# Patient Record
Sex: MALE | Race: WHITE | NOT HISPANIC OR LATINO | Employment: OTHER | URBAN - METROPOLITAN AREA
[De-identification: names, ages, dates, MRNs, and addresses within clinical notes are randomized per-mention and may not be internally consistent; named-entity substitution may affect disease eponyms.]

---

## 2021-04-20 ENCOUNTER — OFFICE VISIT (OUTPATIENT)
Dept: ORTHOPEDIC SURGERY | Facility: CLINIC | Age: 56
End: 2021-04-20

## 2021-04-20 VITALS
BODY MASS INDEX: 33.74 KG/M2 | WEIGHT: 241 LBS | HEIGHT: 71 IN | HEART RATE: 69 BPM | DIASTOLIC BLOOD PRESSURE: 94 MMHG | SYSTOLIC BLOOD PRESSURE: 156 MMHG

## 2021-04-20 DIAGNOSIS — M25.511 RIGHT SHOULDER PAIN, UNSPECIFIED CHRONICITY: ICD-10-CM

## 2021-04-20 DIAGNOSIS — M75.21 BICEPS TENDINITIS OF RIGHT UPPER EXTREMITY: ICD-10-CM

## 2021-04-20 DIAGNOSIS — M75.52 BURSITIS OF LEFT SHOULDER: ICD-10-CM

## 2021-04-20 DIAGNOSIS — M25.512 LEFT SHOULDER PAIN, UNSPECIFIED CHRONICITY: ICD-10-CM

## 2021-04-20 DIAGNOSIS — M75.111 NONTRAUMATIC INCOMPLETE TEAR OF RIGHT ROTATOR CUFF: Primary | ICD-10-CM

## 2021-04-20 DIAGNOSIS — M75.41 IMPINGEMENT SYNDROME OF RIGHT SHOULDER: ICD-10-CM

## 2021-04-20 DIAGNOSIS — M75.22 BICEPS TENDINITIS OF LEFT UPPER EXTREMITY: ICD-10-CM

## 2021-04-20 PROCEDURE — 99204 OFFICE O/P NEW MOD 45 MIN: CPT | Performed by: ORTHOPAEDIC SURGERY

## 2021-04-20 RX ORDER — LANSOPRAZOLE 30 MG/1
30 CAPSULE, DELAYED RELEASE ORAL DAILY
COMMUNITY
Start: 2021-04-02

## 2021-04-20 RX ORDER — METHYLPREDNISOLONE 4 MG/1
TABLET ORAL
Qty: 1 EACH | Refills: 0 | Status: SHIPPED | OUTPATIENT
Start: 2021-04-20 | End: 2021-06-10

## 2021-04-20 RX ORDER — ACETAMINOPHEN 500 MG
500 TABLET ORAL AS NEEDED
COMMUNITY

## 2021-04-20 NOTE — PROGRESS NOTES
[facility] Orthopaedic Surgery Office Visit - Keon Vasquez MD    Office Visit       Patient Name: Jose Luis Hernadez    Chief Complaint:   Chief Complaint   Patient presents with   • Right Shoulder - Pain   • Left Shoulder - Pain       Referring Physician: Referring, Self    History of Present Illness:   Jose Luis Hernadez is a 55 y.o. male who presents with bilateral body part: shoulder Reason: pain.  Onset:Onset: atraumatic and gradual in nature Left and fall on ice Right. The issue has been ongoing for 5 year(s). Pain is a 3/10 on the pain scale. Pain is described as Pain Characterization: dull, aching, burning, throbbing, stabbing and shooting. Associated symptoms include Symptoms: pain. The pain is worse with sitting, sleeping and working; ice and pain medication and/or NSAID improve the pain. Previous treatments have included: physical therapy and viscosupplementation (last injection 2018 by Dr. Vasquez).  I have reviewed the patient's history of present illness as noted/entered above.    I have reviewed the patient's past medical history, surgical history, social history, family history, medications, and allergies as noted in the electronic medical record and as noted/entered.  I have reviewed the patient's review of systems as noted/enter and updated as noted in the patient's HPI.    Bilateral shoulder pain; RIGHT >>>>> left  Right shoulder after a fall x10 years - fell on ice, left shoulder over the last 5 years    Known history of rotator cuff on the right partial subscapularis tear and biceps pathology, labral tearing    Treated with activity modifications, physical therapy, injection in the right shoulder that I performed approximately 4 years ago.    He desires to proceed with an injection prior to his upcoming trip to [place] in June    I have known Jose Luis for many years excellent gentleman.    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  Negative for chills, fatigue and fever.   HENT: Negative.  Negative for congestion and dental problem.    Eyes: Negative.  Negative for blurred vision.   Respiratory: Negative.  Negative for shortness of breath.    Cardiovascular: Negative.  Negative for leg swelling.   Gastrointestinal: Negative.  Negative for abdominal pain.   Endocrine: Negative.  Negative for polyuria.   Genitourinary: Negative.  Negative for difficulty urinating.   Musculoskeletal: Positive for arthralgias and back pain.   Skin: Negative.    Allergic/Immunologic: Negative.    Neurological: Negative.    Hematological: Negative.  Negative for adenopathy.   Psychiatric/Behavioral: Negative.  Negative for behavioral problems.        Past Medical History: History reviewed. No pertinent past medical history.    Past Surgical History:   Past Surgical History:   Procedure Laterality Date   • BACK SURGERY  2012    L5 discectomy   • VASECTOMY         Family History: History reviewed. No pertinent family history.    Social History:   Social History     Socioeconomic History   • Marital status:      Spouse name: Not on file   • Number of children: Not on file   • Years of education: Not on file   • Highest education level: Not on file   Tobacco Use   • Smoking status: Never Smoker   • Smokeless tobacco: Never Used   Substance and Sexual Activity   • Alcohol use: Yes     Comment: weekly   • Drug use: Never   • Sexual activity: Defer       Medications:   Current Outpatient Medications:   •  acetaminophen (TYLENOL) 500 MG tablet, Take 500 mg by mouth As Needed for Mild Pain ., Disp: , Rfl:   •  diclofenac (VOLTAREN) 50 MG EC tablet, , Disp: , Rfl:   •  lansoprazole (PREVACID) 30 MG capsule, Take 30 mg by  "mouth Daily., Disp: , Rfl:   •  methylPREDNISolone (MEDROL) 4 MG dose pack, Use as directed by package instructions, Disp: 1 each, Rfl: 0    Allergies: No Known Allergies    The following portions of the patient's history were reviewed and updated as appropriate: allergies, current medications, past family history, past medical history, past social history, past surgical history and problem list.        Objective    Objective      Vital Signs:   Vitals:    04/20/21 0821   BP: 156/94   Pulse: 69   Weight: 109 kg (241 lb)   Height: 180.3 cm (71\")       Ortho Exam:  Well perfused, SLT intact  Breathing comfortably on room air  Left shoulder mild impingement syndrome with good active and passive range of motion good rotator cuff strength    Right shoulder has a positive Salinas, positive Neer, anterior biceps testing, anterior subscap pain positive upper cut, positive [place]'s at the biceps, positive tenderness palpation about the biceps  Pain with rotator cuff testing on the right, pain with Jobes testing    Results Review:   Imaging Results (Last 24 Hours)     Procedure Component Value Units Date/Time    XR Shoulder 2+ View Right [[id number]] Resulted: 04/20/21 0828     Updated: 04/20/21 0828    Narrative:      Imaging: shoulder x-rays 2 views - AP and axillary x-ray views    Side: RIGHT    Indication for shoulder x-ray 2 views: shoulder pain    Comparison: no comparison views available    Findings: No acute bony pathology. No superior humeral head migration.    The humeral head remains centered in the glenohumeral joint. No evidence   of calcific tendonitis.      I personally reviewed the above x-rays and discussed with the patient.      XR Shoulder 2+ View Left [[id number]] Resulted: 04/20/21 0827     Updated: 04/20/21 0828    Narrative:      Imaging: shoulder x-rays 2 views - AP and axillary x-ray views    Side: LEFT    Indication for shoulder x-ray 2 views: shoulder pain    Comparison: no comparison views " available    Findings: No acute bony pathology. No superior humeral head migration.    The humeral head remains centered in the glenohumeral joint. No evidence   of calcific tendonitis.    Subchondral sclerosis and subchondral cyst formation noted in the greater   tuberosity.    I personally reviewed the above x-rays and discussed with the patient.              Procedures           Assessment / Plan      Assessment/Plan:   Problem List Items Addressed This Visit        Musculoskeletal and Injuries    Nontraumatic incomplete tear of right rotator cuff - Primary    Relevant Medications    methylPREDNISolone (MEDROL) 4 MG dose pack    Other Relevant Orders    MRI Shoulder Right Without Contrast    Biceps tendinitis of left upper extremity    Relevant Medications    methylPREDNISolone (MEDROL) 4 MG dose pack    Impingement syndrome of right shoulder    Bursitis of left shoulder      Other Visit Diagnoses     Left shoulder pain, unspecified chronicity        Relevant Orders    XR Shoulder 2+ View Left (Completed)    Right shoulder pain, unspecified chronicity        Relevant Orders    XR Shoulder 2+ View Right (Completed)          MRI of the shoulder is recommended.  Indication: Right shoulder rotator cuff tear  The MRI is critical to evaluate for rotator cuff tearing and will help with possible surgical planning.    The patient is treated the right shoulder nonoperatively for about 4 years now he has persistent pain, biceps pain, rotator cuff pain desires to proceed with the updated MRI and possibility of surgery.    Medrol dosepak - risks and benefit of this medication was discussed including risks with Diabetes and bump in blood glucose.  A 6-day steroid Medrol dosepak was recommended followed by an NSAID after the dosepak is finished.      I will see him back after the right shoulder MRI he does desire to discuss possible injection prior to his backcountry trip to [place]      Follow Up: After right shoulder MRI         Keon Vasquez MD, FAAOS  Orthopedic Surgeon  Fellowship Trained Shoulder and Elbow Surgeon  [facility]  Orthopedics and Sports Medicine  [address], [address]  [place] [address]    04/20/21  08:44 EDT    Please note that portions of this note may have been completed with a voice recognition program. Efforts were made to edit the dictations, but occasionally words are mistranscribed.

## 2021-04-29 ENCOUNTER — HOSPITAL ENCOUNTER (OUTPATIENT)
Dept: MRI IMAGING | Facility: HOSPITAL | Age: 56
Discharge: HOME | End: 2021-04-29
Admitting: ORTHOPAEDIC SURGERY

## 2021-04-29 DIAGNOSIS — M75.21 BICEPS TENDINITIS OF RIGHT UPPER EXTREMITY: ICD-10-CM

## 2021-04-29 DIAGNOSIS — M75.111 NONTRAUMATIC INCOMPLETE TEAR OF RIGHT ROTATOR CUFF: ICD-10-CM

## 2021-04-29 PROCEDURE — 73221 MRI JOINT UPR EXTREM W/O DYE: CPT

## 2021-05-11 ENCOUNTER — OFFICE VISIT (OUTPATIENT)
Dept: ORTHOPEDIC SURGERY | Facility: CLINIC | Age: 56
End: 2021-05-11

## 2021-05-11 VITALS
BODY MASS INDEX: 32.1 KG/M2 | SYSTOLIC BLOOD PRESSURE: 151 MMHG | WEIGHT: 229.28 LBS | DIASTOLIC BLOOD PRESSURE: 92 MMHG | HEIGHT: 71 IN | HEART RATE: 63 BPM

## 2021-05-11 DIAGNOSIS — M75.41 IMPINGEMENT SYNDROME OF RIGHT SHOULDER: ICD-10-CM

## 2021-05-11 DIAGNOSIS — M75.21 BICEPS TENDINITIS OF RIGHT UPPER EXTREMITY: ICD-10-CM

## 2021-05-11 DIAGNOSIS — M75.51 BURSITIS OF RIGHT SHOULDER: ICD-10-CM

## 2021-05-11 DIAGNOSIS — M75.111 NONTRAUMATIC INCOMPLETE TEAR OF RIGHT ROTATOR CUFF: Primary | ICD-10-CM

## 2021-05-11 PROCEDURE — 99213 OFFICE O/P EST LOW 20 MIN: CPT | Performed by: ORTHOPAEDIC SURGERY

## 2021-05-11 NOTE — PROGRESS NOTES
Procedure   Medium Joint Arthrocentesis  Date/Time: 5/11/2021 8:30 AM  Consent given by: patient  Site marked: site marked  Timeout: Immediately prior to procedure a time out was called to verify the correct patient, procedure, equipment, support staff and site/side marked as required   Supporting Documentation  Indications: pain   Procedure Details  Location: shoulder - Acromioclavicular joint: Right Bicep.  Preparation: Patient was prepped and draped in the usual sterile fashion  Needle size: 23 G  Approach: anterolateral  Medications administered: 5 mL lidocaine PF 1% 1 %; 160 mg methylPREDNISolone acetate 80 MG/ML  Patient tolerance: patient tolerated the procedure well with no immediate complications

## 2021-05-11 NOTE — PROGRESS NOTES
[facility] Orthopaedic Surgery Office Follow Up       Office Follow Up Visit       Patient Name: Jose Luis Hernadez    Chief Complaint:   Chief Complaint   Patient presents with   • Follow-up     Post MRI 04/29/21 - Nontraumatic incomplete tear of right rotator cuff        Referring Physician: No ref. provider found    History of Present Illness:   It has been 3  week(s) since Jose Luis Hernadez's last visit. Jose Luis Hernadez returns to clinic today for F/U: follow-up of rightBody Part: shoulderReason: pain. The issue has been ongoing for 8 year(s). Jose Luis Hernadez rates HIS/HER: hispain at 4/10 on the pain scale. Previous/current treatments: NSAIDS, physical therapy and oral steroids. Current symptoms:Symptoms: same as prior visit. The pain is worse with sleeping; ice improves the pain. Overall, he/she: heis doing the same. I have reviewed the patient's history of present illness as noted/entered above.    I have reviewed the patient's past medical history, surgical history, social history, family history, medications, and allergies as noted in the electronic medical record and as noted/entered.  I have reviewed the patient's review of systems as noted/enter and updated as noted in the patient's HPI.      RIGHT SHOULDER  Pain persists      PRIOR:  Bilateral shoulder pain; RIGHT >>>>> left  Right shoulder after a fall x10 years - fell on ice, left shoulder over the last 5 years     Known history of rotator cuff on the right partial subscapularis tear and biceps pathology, labral tearing     Treated with activity modifications, physical therapy, injection in the right shoulder that I performed approximately 4 years ago.     He desires to proceed with an injection prior to his upcoming trip to [place] in June     I have known Jose Luis for many years excellent gentleman.     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  Negative for chills, fatigue and fever.   HENT: Negative.  Negative for congestion and dental problem.    Eyes: Negative.  Negative for blurred vision.   Respiratory: Negative.  Negative for shortness of breath.    Cardiovascular: Negative.  Negative for leg swelling.   Gastrointestinal: Negative.  Negative for abdominal pain.   Endocrine: Negative.  Negative for polyuria.   Genitourinary: Negative.  Negative for difficulty urinating.   Musculoskeletal: Positive for arthralgias.   Skin: Negative.    Allergic/Immunologic: Negative.    Neurological: Negative.    Hematological: Negative.  Negative for adenopathy.   Psychiatric/Behavioral: Negative.  Negative for behavioral problems.        Past Medical History: History reviewed. No pertinent past medical history.    Past Surgical History:   Past Surgical History:   Procedure Laterality Date   • BACK SURGERY  2012    L5 discectomy   • VASECTOMY         Family History: History reviewed. No pertinent family history.    Social History:   Social History     Socioeconomic History   • Marital status:      Spouse name: Not on file   • Number of children: Not on file   • Years of education: Not on file   • Highest education level: Not on file   Tobacco Use   • Smoking status: Never Smoker   • Smokeless tobacco: Never Used   Substance and Sexual Activity   • Alcohol use: Yes     Comment: weekly   • Drug use: Never   • Sexual activity: Defer       Medications:   Current Outpatient Medications:   •  acetaminophen (TYLENOL) 500 MG tablet, Take 500 mg by mouth As Needed for Mild Pain ., Disp: , Rfl:   •  diclofenac (VOLTAREN) 50 MG EC tablet, , Disp: , Rfl:   •  lansoprazole (PREVACID) 30 MG capsule, Take 30 mg by mouth Daily., Disp: , Rfl:   •  methylPREDNISolone (MEDROL) 4  "MG dose pack, Use as directed by package instructions, Disp: 1 each, Rfl: 0    Allergies: No Known Allergies    The following portions of the patient's history were reviewed and updated as appropriate: allergies, current medications, past family history, past medical history, past social history, past surgical history and problem list.        Objective    Objective      Vital Signs:   Vitals:    05/11/21 0807   BP: 151/92   Pulse: 63   Weight: 104 kg (229 lb 4.5 oz)   Height: 180.3 cm (70.98\")       Ortho Exam:  Stable exam compared to prior:Right shoulder has a positive [place], positive Neer, anterior biceps testing, anterior subscap pain positive upper cut, positive [place]'s at the biceps, positive tenderness palpation about the biceps  Pain with rotator cuff testing on the right, pain with Jobes testing    Anterior biceps pain, appears to be primary pain generator.  Pain with palpation in upper cut biceps    Results Review:  Imaging Results (Last 24 Hours)     ** No results found for the last 24 hours. **          MRI Shoulder Right Without Contrast    Result Date: 4/30/2021  1. Although non arthrographic evaluation there is abnormal signal and tearing of the posterior glenoid labrum with cystic degeneration or paralabral cyst formation along the inferior margin. 2. Partial thickness tearing posterior articular sided fibers supraspinatus.  D:  04/29/2021 E:  04/29/2021    This report was finalized on 4/30/2021 5:02 PM by Dr. Edwin Kaye.        Shoulder MRI   I personally reviewed the patient's MRI and my personal findings are noted below.     SIDE: Right shoulder     DATE: 4/29/2021     Performed: [facility]  Arthrogram: no  Open or standard MRI: standard  MRI quality: good     Indication: shoulder pain     Comparison: NONE     Findings:  Supraspinatus: Partial articular sided and bursal sided tearing of the supraspinatus  Infraspinatus: Intact  Subscapularis: Partial articular sided tearing on my " read  Teres minor: no obvious tear     Fatty infiltration of the rotator cuff: Negative  Muscle atrophy of the rotator cuff: Negative     Long head of the biceps: Tendinopathy and medial subluxation with associated partial articular sided tearing subscapularis     Labrum: Posterior and posterior inferior labral tearing with para labral cyst     AC joint: Mild degenerative changes     Glenohumeral joint: Posterior findings as noted     Fracture: no obvious fracture     Hill-Sachs lesion: absent    Procedures    He is targeting a an injection in the future prior to his trip      Assessment / Plan      Assessment/Plan:   Problem List Items Addressed This Visit        Musculoskeletal and Injuries    Nontraumatic incomplete tear of right rotator cuff - Primary    Relevant Medications    methylPREDNISolone (MEDROL) 4 MG dose pack    Impingement syndrome of right shoulder    Biceps tendinitis of right upper extremity    Relevant Medications    methylPREDNISolone (MEDROL) 4 MG dose pack    Bursitis of right shoulder          RIGHT SHOULDER    She got a big trip coming up desires proceed with an injection in advance of the trip for pain relief and it worked in the years past.  He definitely would want a hold on the surgery until the late fall at the earliest.  We will see him back in 2 to 3 months to reassess.  Counseled on labral tearing, biceps pathology, rotator cuff tearing partial.  Most of the tears have held her own.  Counseled on operative versus nonoperative measures injection completed he will proceed with home exercise program and see me back in 2 to 3 months but will see me in the interim for an injection    Follow Up: He will come back in for reevaluation injection prior to his trip      Keon Vasquez MD, FAAOS  Orthopedic Surgeon  Fellowship Trained Shoulder and Elbow Surgeon  [facility]  Orthopedics and Sports Medicine  [address] [place] [address], [address]  [place]  [address]    05/11/21  08:45 EDT    Please note that portions of this note may have been completed with a voice recognition program. Efforts were made to edit the dictations, but occasionally words are mistranscribed.

## 2021-06-10 ENCOUNTER — OFFICE VISIT (OUTPATIENT)
Dept: ORTHOPEDIC SURGERY | Facility: CLINIC | Age: 56
End: 2021-06-10

## 2021-06-10 VITALS
WEIGHT: 229.28 LBS | BODY MASS INDEX: 32.1 KG/M2 | DIASTOLIC BLOOD PRESSURE: 92 MMHG | HEART RATE: 90 BPM | HEIGHT: 71 IN | SYSTOLIC BLOOD PRESSURE: 151 MMHG

## 2021-06-10 DIAGNOSIS — M75.111 NONTRAUMATIC INCOMPLETE TEAR OF RIGHT ROTATOR CUFF: Primary | ICD-10-CM

## 2021-06-10 DIAGNOSIS — M75.51 BURSITIS OF RIGHT SHOULDER: ICD-10-CM

## 2021-06-10 DIAGNOSIS — M75.21 BICEPS TENDINITIS OF RIGHT UPPER EXTREMITY: ICD-10-CM

## 2021-06-10 DIAGNOSIS — M75.41 IMPINGEMENT SYNDROME OF RIGHT SHOULDER: ICD-10-CM

## 2021-06-10 PROCEDURE — 20610 DRAIN/INJ JOINT/BURSA W/O US: CPT | Performed by: ORTHOPAEDIC SURGERY

## 2021-06-10 RX ORDER — LIDOCAINE HYDROCHLORIDE 10 MG/ML
5 INJECTION, SOLUTION EPIDURAL; INFILTRATION; INTRACAUDAL; PERINEURAL
Status: COMPLETED | OUTPATIENT
Start: 2021-06-10 | End: 2021-06-10

## 2021-06-10 RX ORDER — METHYLPREDNISOLONE ACETATE 80 MG/ML
80 INJECTION, SUSPENSION INTRA-ARTICULAR; INTRALESIONAL; INTRAMUSCULAR; SOFT TISSUE
Status: COMPLETED | OUTPATIENT
Start: 2021-06-10 | End: 2021-06-10

## 2021-06-10 RX ADMIN — METHYLPREDNISOLONE ACETATE 80 MG: 80 INJECTION, SUSPENSION INTRA-ARTICULAR; INTRALESIONAL; INTRAMUSCULAR; SOFT TISSUE at 09:39

## 2021-06-10 RX ADMIN — LIDOCAINE HYDROCHLORIDE 5 ML: 10 INJECTION, SOLUTION EPIDURAL; INFILTRATION; INTRACAUDAL; PERINEURAL at 09:39

## 2021-06-10 NOTE — PROGRESS NOTES
Procedure   Large Joint Arthrocentesis: R subacromial bursa  Date/Time: 6/10/2021 9:39 AM  Consent given by: patient  Site marked: site marked  Timeout: Immediately prior to procedure a time out was called to verify the correct patient, procedure, equipment, support staff and site/side marked as required   Procedure Details  Location: shoulder - R subacromial bursa  Preparation: Patient was prepped and draped in the usual sterile fashion  Needle size: 23 G  Approach: posterior  Medications administered: 5 mL lidocaine PF 1% 1 %; 80 mg methylPREDNISolone acetate 80 MG/ML  Patient tolerance: patient tolerated the procedure well with no immediate complications

## 2021-06-10 NOTE — PROGRESS NOTES
[facility] – [place] Orthopaedic Surgery Office Follow Up       Office Follow Up Visit       Patient Name: Jose Luis Hernadez    Chief Complaint:   Chief Complaint   Patient presents with   • Follow-up     4 weeks- Nontraumatic incomplete tear of right rotator cuff        Referring Physician: No ref. provider found    History of Present Illness:   It has been 4  week(s) since Jose Luis Hernadez's last visit. Jose Luis Hernadez returns to clinic today for F/U: follow-up of rightBody Part: shoulderReason: pain. The issue has been ongoing for 8 year(s). Jose Luis Hernadez rates HIS/HER: hispain at 5/10 on the pain scale. Previous/current treatments: NSAIDS, physical therapy and oral steroids. Current symptoms:Symptoms: same as prior visit. The pain is worse with sleeping and working; ice and pain medication and/or NSAID improves the pain. Overall, he/she: heis doing about the same. I have reviewed the patient's history of present illness as noted/entered above.    I have reviewed the patient's past medical history, surgical history, social history, family history, medications, and allergies as noted in the electronic medical record and as noted/entered.  I have reviewed the patient's review of systems as noted/enter and updated as noted in the patient's HPI.      RIGHT SHOULDER  6/10/2021:  Right shoulder pain, Dosepak did help so having trouble sleep and work  Canoeing 10 miles first day of trip in [place], looking forward to trip      PRIOR:  RIGHT SHOULDER  Pain persists          PRIOR:  Bilateral shoulder pain; RIGHT >>>>> left  Right shoulder after a fall x10 years - fell on ice, left shoulder over the last 5 years     Known history of rotator cuff on the right partial subscapularis tear and biceps pathology, labral tearing     Treated with activity modifications, physical therapy, injection in the right shoulder that I performed approximately 4 years ago.     He desires to proceed with  an injection prior to his upcoming trip to [place] in June     I have known Jose Luis for many years excellent gentleman.     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  Negative for chills, fatigue and fever.   HENT: Negative.  Negative for congestion and dental problem.    Eyes: Negative.  Negative for blurred vision.   Respiratory: Negative.  Negative for shortness of breath.    Cardiovascular: Negative.  Negative for leg swelling.   Gastrointestinal: Negative.  Negative for abdominal pain.   Endocrine: Negative.  Negative for polyuria.   Genitourinary: Negative.  Negative for difficulty urinating.   Musculoskeletal: Positive for arthralgias.   Skin: Negative.    Allergic/Immunologic: Negative.    Neurological: Negative.    Hematological: Negative.  Negative for adenopathy.   Psychiatric/Behavioral: Negative.  Negative for behavioral problems.        Past Medical History: History reviewed. No pertinent past medical history.    Past Surgical History:   Past Surgical History:   Procedure Laterality Date   • BACK SURGERY  2012    L5 discectomy   • VASECTOMY         Family History: History reviewed. No pertinent family history.    Social History:   Social History     Socioeconomic History   • Marital status:      Spouse name: Not on file   • Number of children: Not on file   • Years of education: Not on file   • Highest education level: Not on file   Tobacco Use   • Smoking status: Never Smoker   • Smokeless tobacco: Never Used   Substance and Sexual Activity   • Alcohol use: Yes     Comment: weekly   • Drug use: Never   • Sexual activity: Defer       Medications:   Current Outpatient Medications:   •  acetaminophen (TYLENOL) 500 MG tablet, Take 500 mg by  "mouth As Needed for Mild Pain ., Disp: , Rfl:   •  diclofenac (VOLTAREN) 50 MG EC tablet, , Disp: , Rfl:   •  lansoprazole (PREVACID) 30 MG capsule, Take 30 mg by mouth Daily., Disp: , Rfl:     Allergies: No Known Allergies    The following portions of the patient's history were reviewed and updated as appropriate: allergies, current medications, past family history, past medical history, past social history, past surgical history and problem list.        Objective    Objective      Vital Signs:   Vitals:    06/10/21 0915   BP: 151/92   Pulse: 90   Weight: 104 kg (229 lb 4.5 oz)   Height: 180.3 cm (70.98\")       Ortho Exam:  Right shoulder anterior subacromial pain persist, positive impingement syndrome, anterior biceps pain with testing    Results Review:  Imaging Results (Last 24 Hours)     ** No results found for the last 24 hours. **          MRI Shoulder Right Without Contrast    Result Date: 4/30/2021  1. Although non arthrographic evaluation there is abnormal signal and tearing of the posterior glenoid labrum with cystic degeneration or paralabral cyst formation along the inferior margin. 2. Partial thickness tearing posterior articular sided fibers supraspinatus.  D:  04/29/2021 E:  04/29/2021    This report was finalized on 4/30/2021 5:02 PM by Dr. Edwin Kaye.        Prior MRI as noted    Procedures    Right SHOULDER SUBACROMIAL SPACE INJECTION: Risks and benefits of a shoulder subacromial space injection were discussed and the patient desired to proceed. Verbal consent was obtained. The patient understood the risk of infection, potential skin changes, bump in blood glucose especially with diabetes, nerve injury, possibility of increased pain in the short term, and possible incomplete pain relief.  Using sterile technique, the shoulder subacromial space was injected from a posterior approach with 1mL of 80mg/mL Depo Medrol and 4cc of lidocaine with aspiration prior to injection. The patient tolerated the " procedure without difficulty.  CPT CODE 01478 for major joint aspiration/injection          Assessment / Plan      Assessment/Plan:   Problem List Items Addressed This Visit        Musculoskeletal and Injuries    Nontraumatic incomplete tear of right rotator cuff - Primary    Impingement syndrome of right shoulder    Biceps tendinitis of right upper extremity    Bursitis of right shoulder          RIGHT SHOULDER  Injection, conservative measures, he will keep me updated pending progress he is got a big trip coming up and we will stick with conservative course at this time; surgical planning still possible    Follow Up: 2 to 3 months pending progress after injection.      Keon Vasquez MD, FAAOS  Orthopedic Surgeon  Fellowship Trained Shoulder and Elbow Surgeon  [facility]  Orthopedics and Sports Medicine  [address] [place] [address], [address]  [place] [address]    06/10/21  09:41 EDT    Please note that portions of this note may have been completed with a voice recognition program. Efforts were made to edit the dictations, but occasionally words are mistranscribed.

## 2023-02-27 ENCOUNTER — TELEPHONE (OUTPATIENT)
Dept: ORTHOPEDIC SURGERY | Facility: CLINIC | Age: 58
End: 2023-02-27

## 2023-02-27 NOTE — TELEPHONE ENCOUNTER
Caller: ЕЛЕНА TAN    Relationship to patient: SELF    Best call back number:  [phone]    Chief complaint:  PATIENT REQUESTING APPT. FOR RIGHT SHOULDER INJECTION    Type of visit:  F/U/INJ

## 2023-03-03 ENCOUNTER — OFFICE VISIT (OUTPATIENT)
Dept: ORTHOPEDIC SURGERY | Facility: CLINIC | Age: 58
End: 2023-03-03
Payer: COMMERCIAL

## 2023-03-03 VITALS
DIASTOLIC BLOOD PRESSURE: 98 MMHG | WEIGHT: 250.8 LBS | BODY MASS INDEX: 35.11 KG/M2 | HEIGHT: 71 IN | SYSTOLIC BLOOD PRESSURE: 138 MMHG

## 2023-03-03 DIAGNOSIS — M75.111 NONTRAUMATIC INCOMPLETE TEAR OF RIGHT ROTATOR CUFF: Primary | ICD-10-CM

## 2023-03-03 PROCEDURE — 20610 DRAIN/INJ JOINT/BURSA W/O US: CPT | Performed by: ORTHOPAEDIC SURGERY

## 2023-03-03 RX ORDER — LIDOCAINE HYDROCHLORIDE 10 MG/ML
5 INJECTION, SOLUTION EPIDURAL; INFILTRATION; INTRACAUDAL; PERINEURAL
Status: COMPLETED | OUTPATIENT
Start: 2023-03-03 | End: 2023-03-03

## 2023-03-03 RX ORDER — TRIAMCINOLONE ACETONIDE 40 MG/ML
40 INJECTION, SUSPENSION INTRA-ARTICULAR; INTRAMUSCULAR
Status: COMPLETED | OUTPATIENT
Start: 2023-03-03 | End: 2023-03-03

## 2023-03-03 RX ADMIN — LIDOCAINE HYDROCHLORIDE 5 ML: 10 INJECTION, SOLUTION EPIDURAL; INFILTRATION; INTRACAUDAL; PERINEURAL at 08:52

## 2023-03-03 RX ADMIN — TRIAMCINOLONE ACETONIDE 40 MG: 40 INJECTION, SUSPENSION INTRA-ARTICULAR; INTRAMUSCULAR at 08:52

## 2023-03-03 NOTE — PROGRESS NOTES
Procedure   - Large Joint Arthrocentesis: R subacromial bursa on 3/3/2023 8:52 AM  Indications: pain  Details: (23g) needle, posterior approach  Medications: 5 mL lidocaine PF 1% 1 %; 40 mg triamcinolone acetonide 40 MG/ML  Outcome: tolerated well, no immediate complications  Procedure, treatment alternatives, risks and benefits explained, specific risks discussed. Consent was given by the patient. Immediately prior to procedure a time out was called to verify the correct patient, procedure, equipment, support staff and site/side marked as required. Patient was prepped and draped in the usual sterile fashion.

## 2023-03-03 NOTE — PROGRESS NOTES
[facility] [place]       Office Follow Up Visit       Patient Name: Jose Luis Hernadez    Chief Complaint:   Chief Complaint   Patient presents with   • Follow-up     1.5 year follow up - nontraumatic incomplete tear of right rotator cuff       Referring Physician: No ref. provider found    History of Present Illness:   It has been 1.5  year(s) since Jose Luis Hernadez's last visit. Jose Luis Hernadez returns to clinic today for F/U: follow-up of rightBody Part: shoulderReason: pain. The issue has been ongoing for 10 year(s). Jose Luis Hernadez rates HIS/HER: hispain at 7/10 on the pain scale. Previous/current treatments: NSAIDS, physical therapy and oral steroids. Current symptoms:Symptoms: pain. The pain is worse with sleeping and working; resting and ice improves the pain. Overall, he/she: heis doing better.  I have reviewed the patient's history of present illness as noted/entered above.    I have reviewed the patient's past medical history, surgical history, social history, family history, medications, and allergies as noted in the electronic medical record and as noted/entered.  I have reviewed the patient's review of systems as noted/enter and updated as noted in the patient's HPI.    RIGHT SHOULDER  6/10/2021:  Right shoulder pain, Dosepak did help so having trouble sleep and work  Canoeing 10 miles first day of trip in [place], looking forward to trip        PRIOR:  RIGHT SHOULDER  Pain persists           PRIOR:  Bilateral shoulder pain; RIGHT >>>>> left  Right shoulder after a fall x10 years - fell on ice, left shoulder over the last 5 years  0   Known history of rotator cuff on the right partial subscapularis tear and biceps pathology, labral tearing     Treated with activity modifications, physical therapy, injection in the right shoulder that I performed approximately 4 years ago.     He desires to proceed with an injection prior to his  upcoming trip to [place] in June     I have known Jose Luis for many years excellent gentleman.     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 Although non arthrographic evaluation there is abnormal signal and tearing of the posterior glenoid labrum with cystic degeneration or paralabral cyst formation along the inferior margin. 2. Partial thickness tearing posterior articular sided fibers supraspinatus.  D:  04/29/2021 E:  04/29/2021    This report was finalized on 4/30/2021 5:02 PM by Dr. Edwin Kaye.          Prior MRI as noted    3/3/2023:  Right shoulder, desires repeat injection  Last injection was 6/10/2021  He is done very well with conservative course.  He understands we tried to optimize/conserve the number of injections over time.  However its been nearly 2 years he has done well.    Mimi Martinez NP now in [place]    Subjective   Subjective      Review of Systems   Musculoskeletal: Positive for arthralgias.   All other systems reviewed and are negative.       Past Medical History:   Past Medical History:   Diagnosis Date   • Lumbosacral disc disease 2012   • Rotator cuff syndrome 2012       Past Surgical History:   Past Surgical History:   Procedure Laterality Date   • BACK SURGERY  2012    L5 discectomy   • VASECTOMY         Family History: History reviewed. No pertinent family history.    Social History:   Social History     Socioeconomic History   • Marital status:    Tobacco Use   • Smoking status: Never   • Smokeless tobacco: Never   Substance and Sexual Activity   • Alcohol use: Yes     Alcohol/week: 10.0 standard drinks     Types: 6 Cans of beer, 4 Shots of liquor per week     Comment: weekly   • Drug use: Never   • Sexual activity: Yes     Partners:  "Female     Birth control/protection: Vasectomy       Medications:   Current Outpatient Medications:   •  acetaminophen (TYLENOL) 500 MG tablet, Take 1 tablet by mouth As Needed for Mild Pain., Disp: , Rfl:   •  diclofenac (VOLTAREN) 50 MG EC tablet, , Disp: , Rfl:   •  lansoprazole (PREVACID) 30 MG capsule, Take 1 capsule by mouth Daily., Disp: , Rfl:     Allergies: No Known Allergies    The following portions of the patient's history were reviewed and updated as appropriate: allergies, current medications, past family history, past medical history, past social history, past surgical history and problem list.        Objective    Objective      Vital Signs:   Vitals:    03/03/23 0843   BP: 138/98   Weight: 114 kg (250 lb 12.8 oz)   Height: 180.3 cm (70.98\")       Ortho Exam:  RIGHT SHOULDER - pain    Results Review:  Imaging Results (Last 24 Hours)     ** No results found for the last 24 hours. **          Procedures            Assessment / Plan      Assessment/Plan:   Problem List Items Addressed This Visit        Musculoskeletal and Injuries    Nontraumatic incomplete tear of right rotator cuff - Primary    Relevant Orders    - Large Joint Arthrocentesis: R subacromial bursa       RIGHT SHOULDER  Injections have helped; desires repeat    Follow Up: as needed      Keon Vasquez MD, FAAOS  Orthopedic Surgeon  Fellowship Trained Shoulder and Elbow Surgeon  [facility]  Orthopedics and Sports Medicine  [address], [address]  [place] [address]    03/03/23  09:02 EST  "